# Patient Record
Sex: FEMALE | Race: WHITE | Employment: FULL TIME | URBAN - METROPOLITAN AREA
[De-identification: names, ages, dates, MRNs, and addresses within clinical notes are randomized per-mention and may not be internally consistent; named-entity substitution may affect disease eponyms.]

---

## 2022-11-14 ENCOUNTER — OFFICE VISIT (OUTPATIENT)
Dept: OCCUPATIONAL MEDICINE | Age: 22
End: 2022-11-14

## 2022-11-14 VITALS
RESPIRATION RATE: 18 BRPM | HEIGHT: 63 IN | TEMPERATURE: 98.3 F | DIASTOLIC BLOOD PRESSURE: 58 MMHG | SYSTOLIC BLOOD PRESSURE: 108 MMHG | OXYGEN SATURATION: 98 % | BODY MASS INDEX: 21.26 KG/M2 | WEIGHT: 120 LBS | HEART RATE: 69 BPM

## 2022-11-14 DIAGNOSIS — H65.03 NON-RECURRENT ACUTE SEROUS OTITIS MEDIA OF BOTH EARS: Primary | ICD-10-CM

## 2022-11-14 ASSESSMENT — ENCOUNTER SYMPTOMS
VOMITING: 0
SHORTNESS OF BREATH: 0
COUGH: 0
SINUS PAIN: 0
RHINORRHEA: 0
SORE THROAT: 0
NAUSEA: 0

## 2022-11-14 NOTE — PROGRESS NOTES
serous otitis media of both ears            P:      Recommendations: Suggested she take 3-5 days of OTC 24 hr sudafed and Flonase 2 sprays each nostril daily for 1-2 wks. Monitor s/s and follow up as needed. Follow up: Patient was encouraged to return to the clinic and/or PCP if s/s persist or do not resolve completely. Also advised to seek emergent care if worsening signs and symptoms warrant immediate evaluation including, but not limited to HA, blurred vision, speech disturbance, difficulty with ambulation/gait, numbness, tingling, weakness, syncope, abdominal pain, chest pain, or shortness of breath. *Side effects, adverse effects, risks versus benefits associated with medications prescribed/recommended were discussed with the patient. Patient verbalized understanding and agrees with treatment plan. All questions answered. * I have reviewed the patient's medication list, past medical, family, social, and surgical history and updated the patient record appropriately      Social History     Socioeconomic History    Marital status: Not on file     Spouse name: Not on file    Number of children: Not on file    Years of education: Not on file    Highest education level: Not on file   Occupational History    Not on file   Tobacco Use    Smoking status: Never    Smokeless tobacco: Never   Substance and Sexual Activity    Alcohol use: Never    Drug use: Never    Sexual activity: Never   Other Topics Concern    Not on file   Social History Narrative    Not on file     Social Determinants of Health     Financial Resource Strain: Not on file   Food Insecurity: Not on file   Transportation Needs: Not on file   Physical Activity: Not on file   Stress: Not on file   Social Connections: Not on file   Intimate Partner Violence: Not on file   Housing Stability: Not on file     History reviewed. No pertinent past medical history. No past surgical history on file.   Family History   Problem Relation Age of Onset Diabetes Brother